# Patient Record
Sex: MALE | Race: BLACK OR AFRICAN AMERICAN | NOT HISPANIC OR LATINO | URBAN - METROPOLITAN AREA
[De-identification: names, ages, dates, MRNs, and addresses within clinical notes are randomized per-mention and may not be internally consistent; named-entity substitution may affect disease eponyms.]

---

## 2020-01-21 ENCOUNTER — EMERGENCY (EMERGENCY)
Facility: HOSPITAL | Age: 60
LOS: 1 days | Discharge: HOME | End: 2020-01-21
Attending: PERSONAL EMERGENCY RESPONSE ATTENDANT | Admitting: PERSONAL EMERGENCY RESPONSE ATTENDANT
Payer: COMMERCIAL

## 2020-01-21 VITALS
RESPIRATION RATE: 18 BRPM | DIASTOLIC BLOOD PRESSURE: 105 MMHG | OXYGEN SATURATION: 100 % | SYSTOLIC BLOOD PRESSURE: 187 MMHG | TEMPERATURE: 98 F | HEART RATE: 78 BPM

## 2020-01-21 PROCEDURE — 99282 EMERGENCY DEPT VISIT SF MDM: CPT

## 2020-01-21 RX ORDER — ACETAMINOPHEN 500 MG
975 TABLET ORAL ONCE
Refills: 0 | Status: COMPLETED | OUTPATIENT
Start: 2020-01-21 | End: 2020-01-21

## 2020-01-21 RX ORDER — IBUPROFEN 200 MG
600 TABLET ORAL ONCE
Refills: 0 | Status: COMPLETED | OUTPATIENT
Start: 2020-01-21 | End: 2020-01-21

## 2020-01-21 RX ADMIN — Medication 975 MILLIGRAM(S): at 20:06

## 2020-01-21 RX ADMIN — Medication 600 MILLIGRAM(S): at 20:06

## 2020-01-21 NOTE — ED PROVIDER NOTE - CLINICAL SUMMARY MEDICAL DECISION MAKING FREE TEXT BOX
Attending MD Slaughter.  Pt is well appearing with diffuse muscle TTP.  No areas of bony TTP/stepoffs.  Pt ambulatory.  Tylenol/motrin dosed.  Pt advised to follow-up with PCP to BP reassessed in a non-emergent setting given HTN in ED.  No acute management for BP today.  Pt educated re: discharge and return precautions below and verbalizes understanding of these precautions.

## 2020-01-21 NOTE — ED PROVIDER NOTE - NSFOLLOWUPINSTRUCTIONS_ED_ALL_ED_FT
1.  Please take tylenol 650 mg by mouth up to every 4 hours (do note take tylenol before during or after drinking alcohol) and motrin 600 mg by mouth up to every 6 hours with food and water for comfort.  2.  Please increased hydration with plenty of water to avoid dehydration which will worsen your symptoms.  3.  Please use a heating pad or warm bath for additional discomfort.  4.  Please follow-up with your primary care doctor for reassessment of your blood pressure when you return home.  5. Please return to this or any emergency department immediately for any development of chest pain, severe abdominal pain, development of any numbness/weakness or tingling or inability to control your bladder or bowels.
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
## 2020-01-21 NOTE — ED PROVIDER NOTE - PATIENT PORTAL LINK FT
You can access the FollowMyHealth Patient Portal offered by [facility] by registering at the following website: [email or web address]. By joining [facility]’s FollowMyHealth portal, you will also be able to view your health information using other applications (apps) compatible with our system.
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
## 2021-02-25 NOTE — ED PROVIDER NOTE - MUSCULOSKELETAL MINIMAL EXAM
Diffuse TTP over paraspinal musculature of upper and lower back, no midline spinal TTP/stepoffs The patient is a 66y Male complaining of seizures.